# Patient Record
Sex: FEMALE | Race: WHITE | NOT HISPANIC OR LATINO | URBAN - METROPOLITAN AREA
[De-identification: names, ages, dates, MRNs, and addresses within clinical notes are randomized per-mention and may not be internally consistent; named-entity substitution may affect disease eponyms.]

---

## 2019-09-04 ENCOUNTER — INPATIENT (INPATIENT)
Facility: HOSPITAL | Age: 74
LOS: 4 days | Discharge: HOME | DRG: 460 | End: 2019-09-09
Attending: ORTHOPAEDIC SURGERY | Admitting: ORTHOPAEDIC SURGERY
Payer: MEDICARE

## 2019-09-04 VITALS
DIASTOLIC BLOOD PRESSURE: 60 MMHG | RESPIRATION RATE: 16 BRPM | TEMPERATURE: 98 F | WEIGHT: 114.2 LBS | HEIGHT: 59 IN | OXYGEN SATURATION: 98 % | HEART RATE: 82 BPM | SYSTOLIC BLOOD PRESSURE: 158 MMHG

## 2019-09-04 DIAGNOSIS — M48.00 SPINAL STENOSIS, SITE UNSPECIFIED: ICD-10-CM

## 2019-09-04 RX ORDER — ACETAMINOPHEN 500 MG
650 TABLET ORAL EVERY 6 HOURS
Refills: 0 | Status: DISCONTINUED | OUTPATIENT
Start: 2019-09-04 | End: 2019-09-06

## 2019-09-04 RX ORDER — LABETALOL HCL 100 MG
10 TABLET ORAL ONCE
Refills: 0 | Status: COMPLETED | OUTPATIENT
Start: 2019-09-04 | End: 2019-09-04

## 2019-09-04 RX ORDER — OXYCODONE AND ACETAMINOPHEN 5; 325 MG/1; MG/1
1 TABLET ORAL EVERY 4 HOURS
Refills: 0 | Status: DISCONTINUED | OUTPATIENT
Start: 2019-09-04 | End: 2019-09-06

## 2019-09-04 RX ORDER — OLMESARTAN MEDOXOMIL 5 MG/1
1 TABLET, FILM COATED ORAL
Qty: 0 | Refills: 0 | DISCHARGE

## 2019-09-04 RX ORDER — OXYCODONE AND ACETAMINOPHEN 5; 325 MG/1; MG/1
2 TABLET ORAL EVERY 4 HOURS
Refills: 0 | Status: DISCONTINUED | OUTPATIENT
Start: 2019-09-04 | End: 2019-09-06

## 2019-09-04 RX ORDER — HYDROMORPHONE HYDROCHLORIDE 2 MG/ML
0.5 INJECTION INTRAMUSCULAR; INTRAVENOUS; SUBCUTANEOUS
Refills: 0 | Status: DISCONTINUED | OUTPATIENT
Start: 2019-09-04 | End: 2019-09-06

## 2019-09-04 RX ORDER — LOSARTAN POTASSIUM 100 MG/1
100 TABLET, FILM COATED ORAL DAILY
Refills: 0 | Status: DISCONTINUED | OUTPATIENT
Start: 2019-09-04 | End: 2019-09-06

## 2019-09-04 RX ORDER — CEFAZOLIN SODIUM 1 G
2000 VIAL (EA) INJECTION EVERY 8 HOURS
Refills: 0 | Status: DISCONTINUED | OUTPATIENT
Start: 2019-09-04 | End: 2019-09-04

## 2019-09-04 RX ORDER — METOCLOPRAMIDE HCL 10 MG
10 TABLET ORAL ONCE
Refills: 0 | Status: COMPLETED | OUTPATIENT
Start: 2019-09-04 | End: 2019-09-04

## 2019-09-04 RX ORDER — MAGNESIUM HYDROXIDE 400 MG/1
30 TABLET, CHEWABLE ORAL EVERY 12 HOURS
Refills: 0 | Status: DISCONTINUED | OUTPATIENT
Start: 2019-09-04 | End: 2019-09-06

## 2019-09-04 RX ORDER — ONDANSETRON 8 MG/1
4 TABLET, FILM COATED ORAL ONCE
Refills: 0 | Status: DISCONTINUED | OUTPATIENT
Start: 2019-09-04 | End: 2019-09-06

## 2019-09-04 RX ORDER — DOCUSATE SODIUM 100 MG
100 CAPSULE ORAL THREE TIMES A DAY
Refills: 0 | Status: DISCONTINUED | OUTPATIENT
Start: 2019-09-04 | End: 2019-09-06

## 2019-09-04 RX ORDER — SODIUM CHLORIDE 9 MG/ML
1000 INJECTION, SOLUTION INTRAVENOUS
Refills: 0 | Status: DISCONTINUED | OUTPATIENT
Start: 2019-09-04 | End: 2019-09-05

## 2019-09-04 RX ORDER — HYDROMORPHONE HYDROCHLORIDE 2 MG/ML
0.5 INJECTION INTRAMUSCULAR; INTRAVENOUS; SUBCUTANEOUS EVERY 4 HOURS
Refills: 0 | Status: DISCONTINUED | OUTPATIENT
Start: 2019-09-04 | End: 2019-09-06

## 2019-09-04 RX ORDER — SENNA PLUS 8.6 MG/1
2 TABLET ORAL AT BEDTIME
Refills: 0 | Status: DISCONTINUED | OUTPATIENT
Start: 2019-09-04 | End: 2019-09-06

## 2019-09-04 RX ORDER — CEFAZOLIN SODIUM 1 G
2000 VIAL (EA) INJECTION EVERY 8 HOURS
Refills: 0 | Status: COMPLETED | OUTPATIENT
Start: 2019-09-04 | End: 2019-09-05

## 2019-09-04 RX ORDER — BUPIVACAINE 13.3 MG/ML
20 INJECTION, SUSPENSION, LIPOSOMAL INFILTRATION ONCE
Refills: 0 | Status: DISCONTINUED | OUTPATIENT
Start: 2019-09-04 | End: 2019-09-06

## 2019-09-04 RX ADMIN — Medication 10 MILLIGRAM(S): at 19:55

## 2019-09-04 RX ADMIN — Medication 650 MILLIGRAM(S): at 21:16

## 2019-09-04 RX ADMIN — HYDROMORPHONE HYDROCHLORIDE 0.5 MILLIGRAM(S): 2 INJECTION INTRAMUSCULAR; INTRAVENOUS; SUBCUTANEOUS at 18:35

## 2019-09-04 RX ADMIN — Medication 2000 MILLIGRAM(S): at 21:15

## 2019-09-04 RX ADMIN — SODIUM CHLORIDE 80 MILLILITER(S): 9 INJECTION, SOLUTION INTRAVENOUS at 18:21

## 2019-09-04 RX ADMIN — Medication 10 MILLIGRAM(S): at 19:08

## 2019-09-04 RX ADMIN — HYDROMORPHONE HYDROCHLORIDE 0.5 MILLIGRAM(S): 2 INJECTION INTRAMUSCULAR; INTRAVENOUS; SUBCUTANEOUS at 18:17

## 2019-09-04 RX ADMIN — Medication 650 MILLIGRAM(S): at 22:16

## 2019-09-04 NOTE — H&P ADULT - HISTORY OF PRESENT ILLNESS
73y M with back pain and leg pain x numerous years that is worsening. Patient states pain is worse with walking. Patient has intermittent numbness/tingling. Denies any weakness. Patient does not use any ambulation device. Patient has tried oral pain meds without much relief. Patient here for staged OLIF L3-5, PSF L3-5.

## 2019-09-04 NOTE — H&P ADULT - PROBLEM SELECTOR PLAN 1
Admit to Orthopaedic Service.  Presents today for elective OLIF L3-5, PSF L3-5  Pt medically stable and cleared for procedure today by Dr. Pulliam

## 2019-09-04 NOTE — PROGRESS NOTE ADULT - ASSESSMENT
A/P: 73yFemale s/p L3-L5 OLIF  - Stable  - Pain Control  - DVT ppx: SCDs  - Post op abx: Ancef 2g Q8H x 2 doses  - WBS: WBAT  - PT: pending PT eval    Ortho Pager [phone]

## 2019-09-04 NOTE — H&P ADULT - NSHPPHYSICALEXAM_GEN_ALL_CORE
PE: Normal head, atraumatic. Normal skin, no rashes/lesions/erythema.  Back: No TTP  LE:  Muscle strength 5/5 to b/l  LE  Sensation intact to light touch  Compartments are soft and compressible b/l, nonTTP  Pedal pulse 2+            Rest of PE per medical clearance.

## 2019-09-04 NOTE — H&P ADULT - NSHPLABSRESULTS_GEN_ALL_CORE
Preop cbc, bmp slight decrease in Na to 131, pt/inr, ptt, wnl per clearance nasal swab 3M DOS  UA with positive trace leuk  Repeat BMP and UA  preop cxr wnl per clearance  preop ekg - Sinus rhythm possible left atrial enlargement LBBB - wnl per clearance

## 2019-09-05 LAB
ANION GAP SERPL CALC-SCNC: 14 MMOL/L — SIGNIFICANT CHANGE UP (ref 5–17)
ANION GAP SERPL CALC-SCNC: 17 MMOL/L — SIGNIFICANT CHANGE UP (ref 5–17)
BASOPHILS # BLD AUTO: 0.01 K/UL — SIGNIFICANT CHANGE UP (ref 0–0.2)
BASOPHILS NFR BLD AUTO: 0.1 % — SIGNIFICANT CHANGE UP (ref 0–2)
BUN SERPL-MCNC: 17 MG/DL — SIGNIFICANT CHANGE UP (ref 7–23)
BUN SERPL-MCNC: 19 MG/DL — SIGNIFICANT CHANGE UP (ref 7–23)
CALCIUM SERPL-MCNC: 8.6 MG/DL — SIGNIFICANT CHANGE UP (ref 8.4–10.5)
CALCIUM SERPL-MCNC: 8.9 MG/DL — SIGNIFICANT CHANGE UP (ref 8.4–10.5)
CHLORIDE SERPL-SCNC: 96 MMOL/L — SIGNIFICANT CHANGE UP (ref 96–108)
CHLORIDE SERPL-SCNC: 98 MMOL/L — SIGNIFICANT CHANGE UP (ref 96–108)
CO2 SERPL-SCNC: 15 MMOL/L — LOW (ref 22–31)
CO2 SERPL-SCNC: 19 MMOL/L — LOW (ref 22–31)
CREAT SERPL-MCNC: 0.69 MG/DL — SIGNIFICANT CHANGE UP (ref 0.5–1.3)
CREAT SERPL-MCNC: 0.73 MG/DL — SIGNIFICANT CHANGE UP (ref 0.5–1.3)
EOSINOPHIL # BLD AUTO: 0 K/UL — SIGNIFICANT CHANGE UP (ref 0–0.5)
EOSINOPHIL NFR BLD AUTO: 0 % — SIGNIFICANT CHANGE UP (ref 0–6)
GLUCOSE SERPL-MCNC: 175 MG/DL — HIGH (ref 70–99)
GLUCOSE SERPL-MCNC: 225 MG/DL — HIGH (ref 70–99)
HCT VFR BLD CALC: 33.6 % — LOW (ref 34.5–45)
HCT VFR BLD CALC: 35 % — SIGNIFICANT CHANGE UP (ref 34.5–45)
HCV AB S/CO SERPL IA: 0.07 S/CO — SIGNIFICANT CHANGE UP
HCV AB SERPL-IMP: SIGNIFICANT CHANGE UP
HGB BLD-MCNC: 11.1 G/DL — LOW (ref 11.5–15.5)
HGB BLD-MCNC: 11.6 G/DL — SIGNIFICANT CHANGE UP (ref 11.5–15.5)
IMM GRANULOCYTES NFR BLD AUTO: 0.7 % — SIGNIFICANT CHANGE UP (ref 0–1.5)
LYMPHOCYTES # BLD AUTO: 1.04 K/UL — SIGNIFICANT CHANGE UP (ref 1–3.3)
LYMPHOCYTES # BLD AUTO: 8 % — LOW (ref 13–44)
MCHC RBC-ENTMCNC: 30.9 PG — SIGNIFICANT CHANGE UP (ref 27–34)
MCHC RBC-ENTMCNC: 31.1 PG — SIGNIFICANT CHANGE UP (ref 27–34)
MCHC RBC-ENTMCNC: 33 GM/DL — SIGNIFICANT CHANGE UP (ref 32–36)
MCHC RBC-ENTMCNC: 33.1 GM/DL — SIGNIFICANT CHANGE UP (ref 32–36)
MCV RBC AUTO: 93.6 FL — SIGNIFICANT CHANGE UP (ref 80–100)
MCV RBC AUTO: 93.8 FL — SIGNIFICANT CHANGE UP (ref 80–100)
MONOCYTES # BLD AUTO: 1.04 K/UL — HIGH (ref 0–0.9)
MONOCYTES NFR BLD AUTO: 8 % — SIGNIFICANT CHANGE UP (ref 2–14)
NEUTROPHILS # BLD AUTO: 10.83 K/UL — HIGH (ref 1.8–7.4)
NEUTROPHILS NFR BLD AUTO: 83.2 % — HIGH (ref 43–77)
NRBC # BLD: 0 /100 WBCS — SIGNIFICANT CHANGE UP (ref 0–0)
NRBC # BLD: 0 /100 WBCS — SIGNIFICANT CHANGE UP (ref 0–0)
PLATELET # BLD AUTO: 219 K/UL — SIGNIFICANT CHANGE UP (ref 150–400)
PLATELET # BLD AUTO: 246 K/UL — SIGNIFICANT CHANGE UP (ref 150–400)
POTASSIUM SERPL-MCNC: 4.6 MMOL/L — SIGNIFICANT CHANGE UP (ref 3.5–5.3)
POTASSIUM SERPL-MCNC: 4.6 MMOL/L — SIGNIFICANT CHANGE UP (ref 3.5–5.3)
POTASSIUM SERPL-SCNC: 4.6 MMOL/L — SIGNIFICANT CHANGE UP (ref 3.5–5.3)
POTASSIUM SERPL-SCNC: 4.6 MMOL/L — SIGNIFICANT CHANGE UP (ref 3.5–5.3)
RBC # BLD: 3.59 M/UL — LOW (ref 3.8–5.2)
RBC # BLD: 3.73 M/UL — LOW (ref 3.8–5.2)
RBC # FLD: 11.7 % — SIGNIFICANT CHANGE UP (ref 10.3–14.5)
RBC # FLD: 11.9 % — SIGNIFICANT CHANGE UP (ref 10.3–14.5)
SODIUM SERPL-SCNC: 129 MMOL/L — LOW (ref 135–145)
SODIUM SERPL-SCNC: 130 MMOL/L — LOW (ref 135–145)
WBC # BLD: 13.01 K/UL — HIGH (ref 3.8–10.5)
WBC # BLD: 13.16 K/UL — HIGH (ref 3.8–10.5)
WBC # FLD AUTO: 13.01 K/UL — HIGH (ref 3.8–10.5)
WBC # FLD AUTO: 13.16 K/UL — HIGH (ref 3.8–10.5)

## 2019-09-05 PROCEDURE — 99222 1ST HOSP IP/OBS MODERATE 55: CPT

## 2019-09-05 RX ORDER — DIAZEPAM 5 MG
5 TABLET ORAL ONCE
Refills: 0 | Status: DISCONTINUED | OUTPATIENT
Start: 2019-09-05 | End: 2019-09-05

## 2019-09-05 RX ORDER — SODIUM CHLORIDE 9 MG/ML
1000 INJECTION, SOLUTION INTRAVENOUS
Refills: 0 | Status: DISCONTINUED | OUTPATIENT
Start: 2019-09-05 | End: 2019-09-06

## 2019-09-05 RX ADMIN — Medication 650 MILLIGRAM(S): at 03:03

## 2019-09-05 RX ADMIN — Medication 650 MILLIGRAM(S): at 15:00

## 2019-09-05 RX ADMIN — Medication 650 MILLIGRAM(S): at 09:00

## 2019-09-05 RX ADMIN — Medication 2000 MILLIGRAM(S): at 05:51

## 2019-09-05 RX ADMIN — OXYCODONE AND ACETAMINOPHEN 2 TABLET(S): 5; 325 TABLET ORAL at 22:32

## 2019-09-05 RX ADMIN — OXYCODONE AND ACETAMINOPHEN 2 TABLET(S): 5; 325 TABLET ORAL at 22:02

## 2019-09-05 RX ADMIN — Medication 650 MILLIGRAM(S): at 10:00

## 2019-09-05 RX ADMIN — LOSARTAN POTASSIUM 100 MILLIGRAM(S): 100 TABLET, FILM COATED ORAL at 05:51

## 2019-09-05 RX ADMIN — Medication 650 MILLIGRAM(S): at 16:00

## 2019-09-05 RX ADMIN — Medication 650 MILLIGRAM(S): at 02:03

## 2019-09-05 RX ADMIN — Medication 5 MILLIGRAM(S): at 05:51

## 2019-09-05 NOTE — PHYSICAL THERAPY INITIAL EVALUATION ADULT - PERTINENT HX OF CURRENT PROBLEM, REHAB EVAL
73y M with back pain and leg pain x numerous years that is worsening. Patient states pain is worse with walking. Patient has intermittent numbness/tingling. Denies any weakness. Patient does not use any ambulation device. Patient has tried oral pain meds without much relief. Now s/p  OLIF L3-5, PSF L3-5.

## 2019-09-05 NOTE — PHYSICAL THERAPY INITIAL EVALUATION ADULT - GAIT DEVIATIONS NOTED, PT EVAL
decreased step length/increased time in double stance/decreased velocity of limb motion/increased stride width

## 2019-09-05 NOTE — PHYSICAL THERAPY INITIAL EVALUATION ADULT - GENERAL OBSERVATIONS, REHAB EVAL
Patient received semi-lynn in bed  in NAD on RA, +SCDs, +Prado, +Heplock. Cleared by JOANN Ballesteros. Agreeable to PT.

## 2019-09-05 NOTE — DISCHARGE NOTE PROVIDER - NSDCCPCAREPLAN_GEN_ALL_CORE_FT
PRINCIPAL DISCHARGE DIAGNOSIS  Diagnosis: Spinal stenosis, unspecified spinal region  Assessment and Plan of Treatment: Spinal stenosis, unspecified spinal region

## 2019-09-05 NOTE — PHYSICAL THERAPY INITIAL EVALUATION ADULT - PLANNED THERAPY INTERVENTIONS, PT EVAL
gait training/neuromuscular re-education/postural re-education/strengthening/transfer training/balance training/bed mobility training

## 2019-09-05 NOTE — DISCHARGE NOTE PROVIDER - NSDCFUADDINST_GEN_ALL_CORE_FT
No strenuous activity (bending/twisting), heavy lifting, driving or returning to work until cleared by MD.  Change dressing daily with gauze/tape till post-op day #5, then leave incision open to air.  You may shower post-op day#5, keep incision clean and dry.   Try to have regular bowel movements, take stool softener or laxative if necessary.  May take pepcid or zantac for upset stomach.  May apply ice to affected areas to decrease swelling.  Call to schedule an appt with Dr. Nelson for follow up, if you have staples or sutures they will be removed in office.  Contact your doctor if you experience: fever greater than 101.5, chills, chest pain, difficulty breathing, redness or excessive drainage around the incision, other concerns.  Follow up with your primary care provider. No strenuous activity (bending/twisting), heavy lifting, driving or returning to work until cleared by MD.  Change dressing in Post op day 5 and may leave to air if no drainage.  Try to have regular bowel movements, take stool softener or laxative if necessary.  May take pepcid or zantac for upset stomach.  May apply ice to affected areas to decrease swelling.  Call to schedule an appt with Dr. Nelson for follow up, if you have staples or sutures they will be removed in office.  Contact your doctor if you experience: fever greater than 101.5, chills, chest pain, difficulty breathing, redness or excessive drainage around the incision, other concerns.  Follow up with your primary care provider.

## 2019-09-05 NOTE — PHYSICAL THERAPY INITIAL EVALUATION ADULT - CRITERIA FOR SKILLED THERAPEUTIC INTERVENTIONS
therapy frequency/anticipated equipment needs at discharge/anticipated discharge recommendation/risk reduction/prevention/impairments found/predicted duration of therapy intervention/functional limitations in following categories

## 2019-09-05 NOTE — DISCHARGE NOTE PROVIDER - NSDCCPTREATMENT_GEN_ALL_CORE_FT
PRINCIPAL PROCEDURE  Procedure: OLIF, spine, lumbar  Findings and Treatment: PRINCIPAL PROCEDURE  Procedure: OLIF, spine, lumbar  Findings and Treatment:       SECONDARY PROCEDURE  Procedure: TLIF, 1 level  Findings and Treatment:     Procedure: Fusion of 6 or less spinal segments by posterior approach  Findings and Treatment:

## 2019-09-05 NOTE — DISCHARGE NOTE PROVIDER - CARE PROVIDER_API CALL
Marcus Nelson)  Orthopaedic Surgery  [address] [place] [address]  Phone: [phone]  Fax: [phone]  Follow Up Time:

## 2019-09-05 NOTE — DISCHARGE NOTE PROVIDER - INSTRUCTIONS
as tolerated as tolerated  Patient has low sodium but stable. Eat food with salt at home. Follow up with primary care provider

## 2019-09-05 NOTE — CONSULT NOTE ADULT - SUBJECTIVE AND OBJECTIVE BOX
Patient is a 73y old  Female who presents with a chief complaint of back and leg pain (04 Sep 2019 21:48)        HPI:  73y M with back pain and leg pain x numerous years that is worsening. Patient states pain is worse with walking. Patient has intermittent numbness/tingling. Denies any weakness. Patient does not use any ambulation device. Patient has tried oral pain meds without much relief. Patient here for staged OLIF L3-5, PSF L3-5. (04 Sep 2019 13:33)  s/p surgery - back pain and radiculopathy - continues to have LLE radicular pain     Allergies  No Known Allergies      Health Issues  M54.17 M51.36  No pertinent family history in first degree relatives  Handoff  MEWS Score  No pertinent past medical history  Spinal stenosis, unspecified spinal region  OLIF, spine, lumbar  No significant past surgical history        FAMILY HISTORY:  No pertinent family history in first degree relatives      MEDICATIONS  (STANDING):  acetaminophen   Tablet .. 650 milliGRAM(s) Oral every 6 hours  BUpivacaine liposome 1.3% Injectable (no eMAR) 20 milliLiter(s) Local Injection once  docusate sodium 100 milliGRAM(s) Oral three times a day  losartan 100 milliGRAM(s) Oral daily  ondansetron Injectable 4 milliGRAM(s) IV Push once  senna 2 Tablet(s) Oral at bedtime    MEDICATIONS  (PRN):  HYDROmorphone  Injectable 0.5 milliGRAM(s) SubCutaneous every 4 hours PRN breakthrough pain  HYDROmorphone  Injectable 0.5 milliGRAM(s) IV Push every 15 minutes PRN Severe Pain (7 - 10)  magnesium hydroxide Suspension 30 milliLiter(s) Oral every 12 hours PRN Constipation  oxyCODONE    5 mG/acetaminophen 325 mG 1 Tablet(s) Oral every 4 hours PRN Moderate Pain (4 - 6)  oxyCODONE    5 mG/acetaminophen 325 mG 2 Tablet(s) Oral every 4 hours PRN Severe Pain (7 - 10)      PAST MEDICAL & SURGICAL HISTORY:  No pertinent past medical history  No significant past surgical history      Labs      09-04    131<L>  |  100  |  16  ----------------------------<  202<H>  4.9   |  20<L>  |  0.59    Ca    8.3<L>      04 Sep 2019 17:53    TPro  7.0  /  Alb  4.1  /  TBili  0.9  /  DBili  x   /  AST  64<H>  /  ALT  70<H>  /  AlkPhos  74  09-04      Radiology:    Physical Exam    MENTAL STATUS  -Level of Consciousness- awake    Orientation- person, place time  Language- aphasia/ dysarthria- nl  Memory- recent and remote- nl      Cranial Nerve 1- 12  Pupils- equal and reactive  Eye movements- full   Facial - no asymmetry   Lower CN-nl    Gait and Station- full     MOTOR  Upper-nl  Lower- no foot drop    Reflexes- decreased     Sensation- no sensory level    Cerebellar- no tremors    vascular -no bruits    Assessment- Lumbar radiculopathy    Plan  FU surgery on Friday

## 2019-09-05 NOTE — PHYSICAL THERAPY INITIAL EVALUATION ADULT - ADDITIONAL COMMENTS
Patient lives with spouse in apartment building with no steps to enter. Does not use any Assistive Device at baseline. Denies recent Hx of falls.

## 2019-09-06 LAB
ANION GAP SERPL CALC-SCNC: 12 MMOL/L — SIGNIFICANT CHANGE UP (ref 5–17)
ANION GAP SERPL CALC-SCNC: 13 MMOL/L — SIGNIFICANT CHANGE UP (ref 5–17)
BUN SERPL-MCNC: 13 MG/DL — SIGNIFICANT CHANGE UP (ref 7–23)
BUN SERPL-MCNC: 13 MG/DL — SIGNIFICANT CHANGE UP (ref 7–23)
CALCIUM SERPL-MCNC: 8.2 MG/DL — LOW (ref 8.4–10.5)
CALCIUM SERPL-MCNC: 9 MG/DL — SIGNIFICANT CHANGE UP (ref 8.4–10.5)
CHLORIDE SERPL-SCNC: 98 MMOL/L — SIGNIFICANT CHANGE UP (ref 96–108)
CHLORIDE SERPL-SCNC: 99 MMOL/L — SIGNIFICANT CHANGE UP (ref 96–108)
CO2 SERPL-SCNC: 19 MMOL/L — LOW (ref 22–31)
CO2 SERPL-SCNC: 19 MMOL/L — LOW (ref 22–31)
CREAT SERPL-MCNC: 0.55 MG/DL — SIGNIFICANT CHANGE UP (ref 0.5–1.3)
CREAT SERPL-MCNC: 0.57 MG/DL — SIGNIFICANT CHANGE UP (ref 0.5–1.3)
GLUCOSE SERPL-MCNC: 161 MG/DL — HIGH (ref 70–99)
GLUCOSE SERPL-MCNC: 255 MG/DL — HIGH (ref 70–99)
HCT VFR BLD CALC: 29 % — LOW (ref 34.5–45)
HGB BLD-MCNC: 10 G/DL — LOW (ref 11.5–15.5)
MCHC RBC-ENTMCNC: 32.5 PG — SIGNIFICANT CHANGE UP (ref 27–34)
MCHC RBC-ENTMCNC: 34.5 GM/DL — SIGNIFICANT CHANGE UP (ref 32–36)
MCV RBC AUTO: 94.2 FL — SIGNIFICANT CHANGE UP (ref 80–100)
NRBC # BLD: 0 /100 WBCS — SIGNIFICANT CHANGE UP (ref 0–0)
PLATELET # BLD AUTO: 252 K/UL — SIGNIFICANT CHANGE UP (ref 150–400)
POTASSIUM SERPL-MCNC: 4.6 MMOL/L — SIGNIFICANT CHANGE UP (ref 3.5–5.3)
POTASSIUM SERPL-MCNC: 5 MMOL/L — SIGNIFICANT CHANGE UP (ref 3.5–5.3)
POTASSIUM SERPL-SCNC: 4.6 MMOL/L — SIGNIFICANT CHANGE UP (ref 3.5–5.3)
POTASSIUM SERPL-SCNC: 5 MMOL/L — SIGNIFICANT CHANGE UP (ref 3.5–5.3)
RBC # BLD: 3.08 M/UL — LOW (ref 3.8–5.2)
RBC # FLD: 12.1 % — SIGNIFICANT CHANGE UP (ref 10.3–14.5)
SODIUM SERPL-SCNC: 130 MMOL/L — LOW (ref 135–145)
SODIUM SERPL-SCNC: 130 MMOL/L — LOW (ref 135–145)
WBC # BLD: 17.46 K/UL — HIGH (ref 3.8–10.5)
WBC # FLD AUTO: 17.46 K/UL — HIGH (ref 3.8–10.5)

## 2019-09-06 PROCEDURE — 99232 SBSQ HOSP IP/OBS MODERATE 35: CPT

## 2019-09-06 RX ORDER — SENNA PLUS 8.6 MG/1
2 TABLET ORAL AT BEDTIME
Refills: 0 | Status: DISCONTINUED | OUTPATIENT
Start: 2019-09-06 | End: 2019-09-09

## 2019-09-06 RX ORDER — HYDRALAZINE HCL 50 MG
10 TABLET ORAL ONCE
Refills: 0 | Status: COMPLETED | OUTPATIENT
Start: 2019-09-06 | End: 2019-09-06

## 2019-09-06 RX ORDER — OXYCODONE HYDROCHLORIDE 5 MG/1
5 TABLET ORAL EVERY 4 HOURS
Refills: 0 | Status: DISCONTINUED | OUTPATIENT
Start: 2019-09-06 | End: 2019-09-09

## 2019-09-06 RX ORDER — CEFAZOLIN SODIUM 1 G
2000 VIAL (EA) INJECTION EVERY 8 HOURS
Refills: 0 | Status: COMPLETED | OUTPATIENT
Start: 2019-09-06 | End: 2019-09-07

## 2019-09-06 RX ORDER — HYDROMORPHONE HYDROCHLORIDE 2 MG/ML
0.5 INJECTION INTRAMUSCULAR; INTRAVENOUS; SUBCUTANEOUS
Refills: 0 | Status: DISCONTINUED | OUTPATIENT
Start: 2019-09-06 | End: 2019-09-09

## 2019-09-06 RX ORDER — ONDANSETRON 8 MG/1
4 TABLET, FILM COATED ORAL EVERY 6 HOURS
Refills: 0 | Status: DISCONTINUED | OUTPATIENT
Start: 2019-09-06 | End: 2019-09-09

## 2019-09-06 RX ORDER — BUPIVACAINE 13.3 MG/ML
20 INJECTION, SUSPENSION, LIPOSOMAL INFILTRATION ONCE
Refills: 0 | Status: DISCONTINUED | OUTPATIENT
Start: 2019-09-06 | End: 2019-09-09

## 2019-09-06 RX ORDER — FAMOTIDINE 10 MG/ML
20 INJECTION INTRAVENOUS EVERY 12 HOURS
Refills: 0 | Status: DISCONTINUED | OUTPATIENT
Start: 2019-09-06 | End: 2019-09-09

## 2019-09-06 RX ORDER — METOCLOPRAMIDE HCL 10 MG
10 TABLET ORAL ONCE
Refills: 0 | Status: DISCONTINUED | OUTPATIENT
Start: 2019-09-06 | End: 2019-09-09

## 2019-09-06 RX ORDER — ACETAMINOPHEN 500 MG
650 TABLET ORAL EVERY 6 HOURS
Refills: 0 | Status: DISCONTINUED | OUTPATIENT
Start: 2019-09-06 | End: 2019-09-09

## 2019-09-06 RX ORDER — OXYCODONE HYDROCHLORIDE 5 MG/1
10 TABLET ORAL EVERY 4 HOURS
Refills: 0 | Status: DISCONTINUED | OUTPATIENT
Start: 2019-09-06 | End: 2019-09-09

## 2019-09-06 RX ORDER — DOCUSATE SODIUM 100 MG
100 CAPSULE ORAL THREE TIMES A DAY
Refills: 0 | Status: DISCONTINUED | OUTPATIENT
Start: 2019-09-06 | End: 2019-09-09

## 2019-09-06 RX ORDER — SODIUM CHLORIDE 9 MG/ML
1000 INJECTION, SOLUTION INTRAVENOUS
Refills: 0 | Status: DISCONTINUED | OUTPATIENT
Start: 2019-09-06 | End: 2019-09-06

## 2019-09-06 RX ORDER — CEFAZOLIN SODIUM 1 G
2000 VIAL (EA) INJECTION EVERY 8 HOURS
Refills: 0 | Status: DISCONTINUED | OUTPATIENT
Start: 2019-09-06 | End: 2019-09-06

## 2019-09-06 RX ORDER — DIPHENHYDRAMINE HCL 50 MG
12.5 CAPSULE ORAL EVERY 4 HOURS
Refills: 0 | Status: DISCONTINUED | OUTPATIENT
Start: 2019-09-06 | End: 2019-09-09

## 2019-09-06 RX ADMIN — Medication 10 MILLIGRAM(S): at 15:44

## 2019-09-06 RX ADMIN — OXYCODONE HYDROCHLORIDE 5 MILLIGRAM(S): 5 TABLET ORAL at 21:07

## 2019-09-06 RX ADMIN — SODIUM CHLORIDE 100 MILLILITER(S): 9 INJECTION, SOLUTION INTRAVENOUS at 15:12

## 2019-09-06 RX ADMIN — LOSARTAN POTASSIUM 100 MILLIGRAM(S): 100 TABLET, FILM COATED ORAL at 05:14

## 2019-09-06 RX ADMIN — OXYCODONE HYDROCHLORIDE 5 MILLIGRAM(S): 5 TABLET ORAL at 21:40

## 2019-09-06 RX ADMIN — HYDROMORPHONE HYDROCHLORIDE 0.5 MILLIGRAM(S): 2 INJECTION INTRAMUSCULAR; INTRAVENOUS; SUBCUTANEOUS at 16:11

## 2019-09-06 RX ADMIN — HYDROMORPHONE HYDROCHLORIDE 0.5 MILLIGRAM(S): 2 INJECTION INTRAMUSCULAR; INTRAVENOUS; SUBCUTANEOUS at 16:20

## 2019-09-06 RX ADMIN — Medication 2000 MILLIGRAM(S): at 21:06
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
## 2019-09-06 NOTE — BRIEF OPERATIVE NOTE - NSICDXBRIEFPROCEDURE_GEN_ALL_CORE_FT
PROCEDURES:  OLIF, spine, lumbar 04-Sep-2019 17:23:16  Neal Nova
PROCEDURES:  Fusion of 6 or less spinal segments by posterior approach 06-Sep-2019 14:21:13  Karri Umanzor  TLIF, 1 level 06-Sep-2019 14:20:53  Karri Umanzor

## 2019-09-07 LAB
ANION GAP SERPL CALC-SCNC: 9 MMOL/L — SIGNIFICANT CHANGE UP (ref 5–17)
BASOPHILS # BLD AUTO: 0.01 K/UL — SIGNIFICANT CHANGE UP (ref 0–0.2)
BASOPHILS NFR BLD AUTO: 0.1 % — SIGNIFICANT CHANGE UP (ref 0–2)
BUN SERPL-MCNC: 15 MG/DL — SIGNIFICANT CHANGE UP (ref 7–23)
CALCIUM SERPL-MCNC: 8.3 MG/DL — LOW (ref 8.4–10.5)
CHLORIDE SERPL-SCNC: 100 MMOL/L — SIGNIFICANT CHANGE UP (ref 96–108)
CO2 SERPL-SCNC: 23 MMOL/L — SIGNIFICANT CHANGE UP (ref 22–31)
CREAT SERPL-MCNC: 0.58 MG/DL — SIGNIFICANT CHANGE UP (ref 0.5–1.3)
EOSINOPHIL # BLD AUTO: 0.01 K/UL — SIGNIFICANT CHANGE UP (ref 0–0.5)
EOSINOPHIL NFR BLD AUTO: 0.1 % — SIGNIFICANT CHANGE UP (ref 0–6)
GLUCOSE SERPL-MCNC: 138 MG/DL — HIGH (ref 70–99)
HCT VFR BLD CALC: 26.8 % — LOW (ref 34.5–45)
HGB BLD-MCNC: 8.8 G/DL — LOW (ref 11.5–15.5)
IMM GRANULOCYTES NFR BLD AUTO: 0.6 % — SIGNIFICANT CHANGE UP (ref 0–1.5)
LYMPHOCYTES # BLD AUTO: 16.9 % — SIGNIFICANT CHANGE UP (ref 13–44)
LYMPHOCYTES # BLD AUTO: 2.61 K/UL — SIGNIFICANT CHANGE UP (ref 1–3.3)
MCHC RBC-ENTMCNC: 31.5 PG — SIGNIFICANT CHANGE UP (ref 27–34)
MCHC RBC-ENTMCNC: 32.8 GM/DL — SIGNIFICANT CHANGE UP (ref 32–36)
MCV RBC AUTO: 96.1 FL — SIGNIFICANT CHANGE UP (ref 80–100)
MONOCYTES # BLD AUTO: 1.9 K/UL — HIGH (ref 0–0.9)
MONOCYTES NFR BLD AUTO: 12.3 % — SIGNIFICANT CHANGE UP (ref 2–14)
NEUTROPHILS # BLD AUTO: 10.78 K/UL — HIGH (ref 1.8–7.4)
NEUTROPHILS NFR BLD AUTO: 70 % — SIGNIFICANT CHANGE UP (ref 43–77)
NRBC # BLD: 0 /100 WBCS — SIGNIFICANT CHANGE UP (ref 0–0)
PLATELET # BLD AUTO: 223 K/UL — SIGNIFICANT CHANGE UP (ref 150–400)
POTASSIUM SERPL-MCNC: 4.4 MMOL/L — SIGNIFICANT CHANGE UP (ref 3.5–5.3)
POTASSIUM SERPL-SCNC: 4.4 MMOL/L — SIGNIFICANT CHANGE UP (ref 3.5–5.3)
RBC # BLD: 2.79 M/UL — LOW (ref 3.8–5.2)
RBC # FLD: 11.9 % — SIGNIFICANT CHANGE UP (ref 10.3–14.5)
SODIUM SERPL-SCNC: 132 MMOL/L — LOW (ref 135–145)
WBC # BLD: 15.41 K/UL — HIGH (ref 3.8–10.5)
WBC # FLD AUTO: 15.41 K/UL — HIGH (ref 3.8–10.5)

## 2019-09-07 PROCEDURE — 99232 SBSQ HOSP IP/OBS MODERATE 35: CPT

## 2019-09-07 RX ADMIN — Medication 100 MILLIGRAM(S): at 13:49

## 2019-09-07 RX ADMIN — Medication 650 MILLIGRAM(S): at 10:48

## 2019-09-07 RX ADMIN — OXYCODONE HYDROCHLORIDE 5 MILLIGRAM(S): 5 TABLET ORAL at 22:00

## 2019-09-07 RX ADMIN — OXYCODONE HYDROCHLORIDE 5 MILLIGRAM(S): 5 TABLET ORAL at 21:17

## 2019-09-07 RX ADMIN — OXYCODONE HYDROCHLORIDE 5 MILLIGRAM(S): 5 TABLET ORAL at 17:30

## 2019-09-07 RX ADMIN — OXYCODONE HYDROCHLORIDE 5 MILLIGRAM(S): 5 TABLET ORAL at 17:01

## 2019-09-07 RX ADMIN — Medication 2000 MILLIGRAM(S): at 05:17

## 2019-09-07 RX ADMIN — Medication 650 MILLIGRAM(S): at 17:00

## 2019-09-07 RX ADMIN — Medication 650 MILLIGRAM(S): at 10:10

## 2019-09-07 RX ADMIN — OXYCODONE HYDROCHLORIDE 5 MILLIGRAM(S): 5 TABLET ORAL at 10:48

## 2019-09-07 RX ADMIN — Medication 100 MILLIGRAM(S): at 21:12

## 2019-09-07 RX ADMIN — Medication 100 MILLIGRAM(S): at 05:18

## 2019-09-07 RX ADMIN — OXYCODONE HYDROCHLORIDE 5 MILLIGRAM(S): 5 TABLET ORAL at 06:15

## 2019-09-07 RX ADMIN — OXYCODONE HYDROCHLORIDE 5 MILLIGRAM(S): 5 TABLET ORAL at 05:35

## 2019-09-07 RX ADMIN — OXYCODONE HYDROCHLORIDE 5 MILLIGRAM(S): 5 TABLET ORAL at 10:10

## 2019-09-07 RX ADMIN — Medication 650 MILLIGRAM(S): at 17:30

## 2019-09-08 LAB
ANION GAP SERPL CALC-SCNC: 11 MMOL/L — SIGNIFICANT CHANGE UP (ref 5–17)
BASOPHILS # BLD AUTO: 0.05 K/UL — SIGNIFICANT CHANGE UP (ref 0–0.2)
BASOPHILS NFR BLD AUTO: 0.4 % — SIGNIFICANT CHANGE UP (ref 0–2)
BUN SERPL-MCNC: 15 MG/DL — SIGNIFICANT CHANGE UP (ref 7–23)
CALCIUM SERPL-MCNC: 8.2 MG/DL — LOW (ref 8.4–10.5)
CHLORIDE SERPL-SCNC: 97 MMOL/L — SIGNIFICANT CHANGE UP (ref 96–108)
CO2 SERPL-SCNC: 21 MMOL/L — LOW (ref 22–31)
CREAT SERPL-MCNC: 0.53 MG/DL — SIGNIFICANT CHANGE UP (ref 0.5–1.3)
EOSINOPHIL # BLD AUTO: 0.15 K/UL — SIGNIFICANT CHANGE UP (ref 0–0.5)
EOSINOPHIL NFR BLD AUTO: 1.1 % — SIGNIFICANT CHANGE UP (ref 0–6)
GLUCOSE SERPL-MCNC: 150 MG/DL — HIGH (ref 70–99)
HCT VFR BLD CALC: 23.9 % — LOW (ref 34.5–45)
HGB BLD-MCNC: 8.2 G/DL — LOW (ref 11.5–15.5)
IMM GRANULOCYTES NFR BLD AUTO: 1.1 % — SIGNIFICANT CHANGE UP (ref 0–1.5)
LYMPHOCYTES # BLD AUTO: 23.5 % — SIGNIFICANT CHANGE UP (ref 13–44)
LYMPHOCYTES # BLD AUTO: 3.34 K/UL — HIGH (ref 1–3.3)
MCHC RBC-ENTMCNC: 31.7 PG — SIGNIFICANT CHANGE UP (ref 27–34)
MCHC RBC-ENTMCNC: 34.3 GM/DL — SIGNIFICANT CHANGE UP (ref 32–36)
MCV RBC AUTO: 92.3 FL — SIGNIFICANT CHANGE UP (ref 80–100)
MONOCYTES # BLD AUTO: 1.38 K/UL — HIGH (ref 0–0.9)
MONOCYTES NFR BLD AUTO: 9.7 % — SIGNIFICANT CHANGE UP (ref 2–14)
NEUTROPHILS # BLD AUTO: 9.14 K/UL — HIGH (ref 1.8–7.4)
NEUTROPHILS NFR BLD AUTO: 64.2 % — SIGNIFICANT CHANGE UP (ref 43–77)
NRBC # BLD: 0 /100 WBCS — SIGNIFICANT CHANGE UP (ref 0–0)
PLATELET # BLD AUTO: 227 K/UL — SIGNIFICANT CHANGE UP (ref 150–400)
POTASSIUM SERPL-MCNC: 4.1 MMOL/L — SIGNIFICANT CHANGE UP (ref 3.5–5.3)
POTASSIUM SERPL-SCNC: 4.1 MMOL/L — SIGNIFICANT CHANGE UP (ref 3.5–5.3)
RBC # BLD: 2.59 M/UL — LOW (ref 3.8–5.2)
RBC # FLD: 12.2 % — SIGNIFICANT CHANGE UP (ref 10.3–14.5)
SODIUM SERPL-SCNC: 129 MMOL/L — LOW (ref 135–145)
WBC # BLD: 14.22 K/UL — HIGH (ref 3.8–10.5)
WBC # FLD AUTO: 14.22 K/UL — HIGH (ref 3.8–10.5)

## 2019-09-08 RX ORDER — LOSARTAN POTASSIUM 100 MG/1
100 TABLET, FILM COATED ORAL DAILY
Refills: 0 | Status: DISCONTINUED | OUTPATIENT
Start: 2019-09-08 | End: 2019-09-09

## 2019-09-08 RX ORDER — SODIUM CHLORIDE 9 MG/ML
1 INJECTION INTRAMUSCULAR; INTRAVENOUS; SUBCUTANEOUS THREE TIMES A DAY
Refills: 0 | Status: DISCONTINUED | OUTPATIENT
Start: 2019-09-08 | End: 2019-09-09

## 2019-09-08 RX ORDER — MAGNESIUM HYDROXIDE 400 MG/1
30 TABLET, CHEWABLE ORAL DAILY
Refills: 0 | Status: DISCONTINUED | OUTPATIENT
Start: 2019-09-08 | End: 2019-09-09

## 2019-09-08 RX ADMIN — OXYCODONE HYDROCHLORIDE 5 MILLIGRAM(S): 5 TABLET ORAL at 15:08

## 2019-09-08 RX ADMIN — OXYCODONE HYDROCHLORIDE 5 MILLIGRAM(S): 5 TABLET ORAL at 10:34

## 2019-09-08 RX ADMIN — SODIUM CHLORIDE 1 GRAM(S): 9 INJECTION INTRAMUSCULAR; INTRAVENOUS; SUBCUTANEOUS at 21:24

## 2019-09-08 RX ADMIN — OXYCODONE HYDROCHLORIDE 5 MILLIGRAM(S): 5 TABLET ORAL at 23:17

## 2019-09-08 RX ADMIN — OXYCODONE HYDROCHLORIDE 5 MILLIGRAM(S): 5 TABLET ORAL at 20:50

## 2019-09-08 RX ADMIN — SENNA PLUS 2 TABLET(S): 8.6 TABLET ORAL at 21:24

## 2019-09-08 RX ADMIN — SODIUM CHLORIDE 1 GRAM(S): 9 INJECTION INTRAMUSCULAR; INTRAVENOUS; SUBCUTANEOUS at 13:43

## 2019-09-08 RX ADMIN — OXYCODONE HYDROCHLORIDE 5 MILLIGRAM(S): 5 TABLET ORAL at 23:55

## 2019-09-08 RX ADMIN — OXYCODONE HYDROCHLORIDE 5 MILLIGRAM(S): 5 TABLET ORAL at 06:18

## 2019-09-08 RX ADMIN — OXYCODONE HYDROCHLORIDE 5 MILLIGRAM(S): 5 TABLET ORAL at 14:34

## 2019-09-08 RX ADMIN — OXYCODONE HYDROCHLORIDE 5 MILLIGRAM(S): 5 TABLET ORAL at 09:55

## 2019-09-08 RX ADMIN — LOSARTAN POTASSIUM 100 MILLIGRAM(S): 100 TABLET, FILM COATED ORAL at 05:55

## 2019-09-08 RX ADMIN — OXYCODONE HYDROCHLORIDE 5 MILLIGRAM(S): 5 TABLET ORAL at 05:39

## 2019-09-08 RX ADMIN — OXYCODONE HYDROCHLORIDE 5 MILLIGRAM(S): 5 TABLET ORAL at 19:09

## 2019-09-08 RX ADMIN — Medication 100 MILLIGRAM(S): at 21:24

## 2019-09-09 VITALS
OXYGEN SATURATION: 100 % | HEART RATE: 84 BPM | SYSTOLIC BLOOD PRESSURE: 144 MMHG | DIASTOLIC BLOOD PRESSURE: 50 MMHG | TEMPERATURE: 98 F | RESPIRATION RATE: 16 BRPM

## 2019-09-09 DIAGNOSIS — Z98.890 OTHER SPECIFIED POSTPROCEDURAL STATES: ICD-10-CM

## 2019-09-09 DIAGNOSIS — E87.1 HYPO-OSMOLALITY AND HYPONATREMIA: ICD-10-CM

## 2019-09-09 LAB
ANION GAP SERPL CALC-SCNC: 9 MMOL/L — SIGNIFICANT CHANGE UP (ref 5–17)
BASOPHILS # BLD AUTO: 0 K/UL — SIGNIFICANT CHANGE UP (ref 0–0.2)
BASOPHILS NFR BLD AUTO: 0 % — SIGNIFICANT CHANGE UP (ref 0–2)
BUN SERPL-MCNC: 10 MG/DL — SIGNIFICANT CHANGE UP (ref 7–23)
CALCIUM SERPL-MCNC: 8.7 MG/DL — SIGNIFICANT CHANGE UP (ref 8.4–10.5)
CHLORIDE SERPL-SCNC: 97 MMOL/L — SIGNIFICANT CHANGE UP (ref 96–108)
CO2 SERPL-SCNC: 24 MMOL/L — SIGNIFICANT CHANGE UP (ref 22–31)
CREAT SERPL-MCNC: 0.59 MG/DL — SIGNIFICANT CHANGE UP (ref 0.5–1.3)
EOSINOPHIL # BLD AUTO: 0.19 K/UL — SIGNIFICANT CHANGE UP (ref 0–0.5)
EOSINOPHIL NFR BLD AUTO: 1.7 % — SIGNIFICANT CHANGE UP (ref 0–6)
GLUCOSE SERPL-MCNC: 164 MG/DL — HIGH (ref 70–99)
HCT VFR BLD CALC: 23.9 % — LOW (ref 34.5–45)
HGB BLD-MCNC: 7.9 G/DL — LOW (ref 11.5–15.5)
LYMPHOCYTES # BLD AUTO: 2.72 K/UL — SIGNIFICANT CHANGE UP (ref 1–3.3)
LYMPHOCYTES # BLD AUTO: 24.3 % — SIGNIFICANT CHANGE UP (ref 13–44)
MCHC RBC-ENTMCNC: 31 PG — SIGNIFICANT CHANGE UP (ref 27–34)
MCHC RBC-ENTMCNC: 33.1 GM/DL — SIGNIFICANT CHANGE UP (ref 32–36)
MCV RBC AUTO: 93.7 FL — SIGNIFICANT CHANGE UP (ref 80–100)
MONOCYTES # BLD AUTO: 0.39 K/UL — SIGNIFICANT CHANGE UP (ref 0–0.9)
MONOCYTES NFR BLD AUTO: 3.5 % — SIGNIFICANT CHANGE UP (ref 2–14)
NEUTROPHILS # BLD AUTO: 7.69 K/UL — HIGH (ref 1.8–7.4)
NEUTROPHILS NFR BLD AUTO: 68.7 % — SIGNIFICANT CHANGE UP (ref 43–77)
NRBC # BLD: SIGNIFICANT CHANGE UP /100 WBCS (ref 0–0)
PLATELET # BLD AUTO: 255 K/UL — SIGNIFICANT CHANGE UP (ref 150–400)
POTASSIUM SERPL-MCNC: 3.8 MMOL/L — SIGNIFICANT CHANGE UP (ref 3.5–5.3)
POTASSIUM SERPL-SCNC: 3.8 MMOL/L — SIGNIFICANT CHANGE UP (ref 3.5–5.3)
RBC # BLD: 2.55 M/UL — LOW (ref 3.8–5.2)
RBC # FLD: 12 % — SIGNIFICANT CHANGE UP (ref 10.3–14.5)
SODIUM SERPL-SCNC: 130 MMOL/L — LOW (ref 135–145)
WBC # BLD: 11.19 K/UL — HIGH (ref 3.8–10.5)
WBC # FLD AUTO: 11.19 K/UL — HIGH (ref 3.8–10.5)

## 2019-09-09 PROCEDURE — 99222 1ST HOSP IP/OBS MODERATE 55: CPT | Mod: GC

## 2019-09-09 RX ORDER — DOCUSATE SODIUM 100 MG
1 CAPSULE ORAL
Qty: 0 | Refills: 0 | DISCHARGE
Start: 2019-09-09

## 2019-09-09 RX ORDER — ACETAMINOPHEN 500 MG
2 TABLET ORAL
Qty: 0 | Refills: 0 | DISCHARGE
Start: 2019-09-09

## 2019-09-09 RX ORDER — SENNA PLUS 8.6 MG/1
2 TABLET ORAL
Qty: 0 | Refills: 0 | DISCHARGE
Start: 2019-09-09

## 2019-09-09 RX ORDER — OXYCODONE HYDROCHLORIDE 5 MG/1
1 TABLET ORAL
Qty: 42 | Refills: 0
Start: 2019-09-09 | End: 2019-09-15

## 2019-09-09 RX ADMIN — OXYCODONE HYDROCHLORIDE 5 MILLIGRAM(S): 5 TABLET ORAL at 10:53

## 2019-09-09 RX ADMIN — OXYCODONE HYDROCHLORIDE 5 MILLIGRAM(S): 5 TABLET ORAL at 11:53

## 2019-09-09 RX ADMIN — LOSARTAN POTASSIUM 100 MILLIGRAM(S): 100 TABLET, FILM COATED ORAL at 05:32

## 2019-09-09 RX ADMIN — SODIUM CHLORIDE 1 GRAM(S): 9 INJECTION INTRAMUSCULAR; INTRAVENOUS; SUBCUTANEOUS at 05:32

## 2019-09-09 RX ADMIN — Medication 100 MILLIGRAM(S): at 05:32

## 2019-09-09 RX ADMIN — OXYCODONE HYDROCHLORIDE 5 MILLIGRAM(S): 5 TABLET ORAL at 03:36

## 2019-09-09 RX ADMIN — OXYCODONE HYDROCHLORIDE 5 MILLIGRAM(S): 5 TABLET ORAL at 04:36

## 2019-09-09 NOTE — CONSULT NOTE ADULT - PROBLEM SELECTOR RECOMMENDATION 9
the initial Na was 135 and decreased postop.  She is tolerating diet.  Na increased.  She is to incraese her salt intake and fluid restriction to 1.5 l per day . She was instructed to follow with her PMD this week

## 2019-09-09 NOTE — CONSULT NOTE ADULT - SUBJECTIVE AND OBJECTIVE BOX
Patient is a 73y old  Female who presents with a chief complaint of back and leg pain (09 Sep 2019 10:52)      HPI:  73y M with back pain and leg pain x numerous years that is worsening. Patient states pain is worse with walking. Patient has intermittent numbness/tingling. Denies any weakness. Patient does not use any ambulation device. Patient has tried oral pain meds without much relief. Patient here for staged OLIF L3-5, PSF L3-5. (04 Sep 2019 13:33)    she is doing well.  The pain is controlled.  She is moving around.  PAST MEDICAL & SURGICAL HISTORY:  No pertinent past medical history  No significant past surgical history      FAMILY HISTORY:  No pertinent family history in first degree relatives      SOCIAL HISTORY:  Smoking Status: [ ] Current, [ ] Former, [x ] Never  Pack Years:    MEDICATIONS:  Pulmonary:  diphenhydrAMINE   Injectable 12.5 milliGRAM(s) IV Push every 4 hours PRN    Antimicrobials:    Anticoagulants:    Onc:    GI/:  aluminum hydroxide/magnesium hydroxide/simethicone Suspension 30 milliLiter(s) Oral every 12 hours PRN  docusate sodium 100 milliGRAM(s) Oral three times a day  famotidine    Tablet 20 milliGRAM(s) Oral every 12 hours PRN  magnesium hydroxide Suspension 30 milliLiter(s) Oral daily PRN  senna 2 Tablet(s) Oral at bedtime    Endocrine:    Cardiac:  losartan 100 milliGRAM(s) Oral daily    Other Medications:  acetaminophen   Tablet .. 650 milliGRAM(s) Oral every 6 hours PRN  BUpivacaine liposome 1.3% Injectable (no eMAR) 20 milliLiter(s) Local Injection once  HYDROmorphone  Injectable 0.5 milliGRAM(s) SubCutaneous every 3 hours PRN  HYDROmorphone  Injectable 0.5 milliGRAM(s) IV Push every 15 minutes PRN  metoclopramide Injectable 10 milliGRAM(s) IV Push once PRN  ondansetron Injectable 4 milliGRAM(s) IV Push every 6 hours PRN  oxyCODONE    IR 5 milliGRAM(s) Oral every 4 hours PRN  oxyCODONE    IR 10 milliGRAM(s) Oral every 4 hours PRN  sodium chloride 1 Gram(s) Oral three times a day      Allergies    No Known Allergies    Intolerances        Vital Signs Last 24 Hrs  T(C): 36.9 (09 Sep 2019 09:26), Max: 36.9 (09 Sep 2019 09:26)  T(F): 98.4 (09 Sep 2019 09:26), Max: 98.4 (09 Sep 2019 09:26)  HR: 84 (09 Sep 2019 09:26) (84 - 84)  BP: 144/50 (09 Sep 2019 09:26) (137/54 - 144/50)  BP(mean): --  RR: 16 (09 Sep 2019 09:26) (15 - 16)  SpO2: 100% (09 Sep 2019 09:26) (96% - 100%)    09-08 @ 07:01  -  09-09 @ 07:00  --------------------------------------------------------  IN: 200 mL / OUT: 615 mL / NET: -415 mL    09-09 @ 07:01  -  09-09 @ 21:07  --------------------------------------------------------  IN: 180 mL / OUT: 400 mL / NET: -220 mL          LABS:      CBC Full  -  ( 09 Sep 2019 08:05 )  WBC Count : 11.19 K/uL  RBC Count : 2.55 M/uL  Hemoglobin : 7.9 g/dL  Hematocrit : 23.9 %  Platelet Count - Automated : 255 K/uL  Mean Cell Volume : 93.7 fl  Mean Cell Hemoglobin : 31.0 pg  Mean Cell Hemoglobin Concentration : 33.1 gm/dL  Auto Neutrophil # : 7.69 K/uL  Auto Lymphocyte # : 2.72 K/uL  Auto Monocyte # : 0.39 K/uL  Auto Eosinophil # : 0.19 K/uL  Auto Basophil # : 0.00 K/uL  Auto Neutrophil % : 68.7 %  Auto Lymphocyte % : 24.3 %  Auto Monocyte % : 3.5 %  Auto Eosinophil % : 1.7 %  Auto Basophil % : 0.0 %    09-09    130<L>  |  97  |  10  ----------------------------<  164<H>  3.8   |  24  |  0.59    Ca    8.7      09 Sep 2019 08:05                          RADIOLOGY & ADDITIONAL STUDIES (The following images were personally reviewed):

## 2019-09-09 NOTE — PROGRESS NOTE ADULT - REASON FOR ADMISSION
back and leg pain

## 2019-09-09 NOTE — DISCHARGE NOTE NURSING/CASE MANAGEMENT/SOCIAL WORK - PATIENT PORTAL LINK FT
You can access the FollowMyHealth Patient Portal offered by [facility] by registering at the following website: [email or web address]. By joining [employer]’s FollowMyHealth portal, you will also be able to view your health information using other applications (apps) compatible with our system.

## 2019-09-09 NOTE — PROGRESS NOTE ADULT - PROVIDER SPECIALTY LIST ADULT
Neurology
Orthopedics
Neurology
Orthopedics
Orthopedics

## 2019-09-09 NOTE — PROGRESS NOTE ADULT - SUBJECTIVE AND OBJECTIVE BOX
Neurology Follow up note    Name  FELIPE RODGERS    HPI:  73y M with back pain and leg pain x numerous years that is worsening. Patient states pain is worse with walking. Patient has intermittent numbness/tingling. Denies any weakness. Patient does not use any ambulation device. Patient has tried oral pain meds without much relief. Patient here for staged OLIF L3-5, PSF L3-5. (04 Sep 2019 13:33)      Interval History - s/p surgery number 2 - no new weakness numbness or tingling in the legs        REVIEW OF SYSTEMS    Vital Signs Last 24 Hrs  T(C): 37.2 (07 Sep 2019 08:33), Max: 37.7 (07 Sep 2019 00:34)  T(F): 98.9 (07 Sep 2019 08:33), Max: 99.8 (07 Sep 2019 00:34)  HR: 96 (07 Sep 2019 08:33) (85 - 104)  BP: 142/70 (07 Sep 2019 08:33) (126/53 - 177/73)  BP(mean): 72 (06 Sep 2019 17:00) (72 - 114)  RR: 16 (07 Sep 2019 08:33) (7 - 23)  SpO2: 99% (07 Sep 2019 08:33) (95% - 99%)    Physical Exam-     Mental Status- awake and alert    Cranial Nerves- full EOM    Gait and station- n/a    Motor- moves all 4 extremities    Reflexes- decreased    Sensation- no sensory level    Coordination- no tremors    Vascular - no bruits    Medications  acetaminophen   Tablet .. 650 milliGRAM(s) Oral every 6 hours PRN  aluminum hydroxide/magnesium hydroxide/simethicone Suspension 30 milliLiter(s) Oral every 12 hours PRN  BUpivacaine liposome 1.3% Injectable (no eMAR) 20 milliLiter(s) Local Injection once  diphenhydrAMINE   Injectable 12.5 milliGRAM(s) IV Push every 4 hours PRN  docusate sodium 100 milliGRAM(s) Oral three times a day  famotidine    Tablet 20 milliGRAM(s) Oral every 12 hours PRN  HYDROmorphone  Injectable 0.5 milliGRAM(s) IV Push every 15 minutes PRN  HYDROmorphone  Injectable 0.5 milliGRAM(s) SubCutaneous every 3 hours PRN  metoclopramide Injectable 10 milliGRAM(s) IV Push once PRN  ondansetron Injectable 4 milliGRAM(s) IV Push every 6 hours PRN  oxyCODONE    IR 5 milliGRAM(s) Oral every 4 hours PRN  oxyCODONE    IR 10 milliGRAM(s) Oral every 4 hours PRN  senna 2 Tablet(s) Oral at bedtime      Lab      Radiology    Assessment- Lumbar radiculopathy    Plan as per ortho
Ortho Post Op Check    Procedure: L3-L5 OLIF  Surgeon: Leslie    Pt comfortable without complaints, pain controlled  Denies CP, SOB, numbness/tingling   Patient with complaints of nausea/vomiting earlier in PACU which has resolved    Vital Signs Last 24 Hrs  T(C): 36.4 (09-04-19 @ 20:56), Max: 36.9 (09-04-19 @ 20:02)  T(F): 97.5 (09-04-19 @ 20:56), Max: 98.4 (09-04-19 @ 20:02)  HR: 70 (09-04-19 @ 20:56) (56 - 70)  BP: 154/62 (09-04-19 @ 20:56) (146/60 - 191/86)  BP(mean): 89 (09-04-19 @ 20:28) (89 - 136)  RR: 16 (09-04-19 @ 20:56) (10 - 20)  SpO2: 96% (09-04-19 @ 20:56) (91% - 99%)    Pt HTNive in PACU, now resolved and VSS  General: Pt Alert and oriented, NAD; Prado in place draining yellow urine  L flank: Prineo DSG C/D/I  Pulses: Feet WWP; DP pulse 2+; Cap refill < 2 sec  Sensation: SILT L2-S1 dermatomes and symmetric  Motor: Hip Flex/Knee Ext/EHL/TA/GS 5/5 and symmetric      04 Sep 2019 17:53    131    |  100    |  16     ----------------------------<  202    4.9     |  20     |  0.59     Ca    8.3        04 Sep 2019 17:53    TPro  7.0    /  Alb  4.1    /  TBili  0.9    /  DBili  x      /  AST  64     /  ALT  70     /  AlkPhos  74     04 Sep 2019 17:53
Orthopaedic Spine Resident Note    S:  No acute overnight events.  Pain well controlled.    No fevers/chills/shortness of breath/chest pain/new neurologic complaints.    Vital Signs Last 24 Hrs  T(C): 36.8 (05 Sep 2019 15:17), Max: 36.9 (04 Sep 2019 20:02)  T(F): 98.3 (05 Sep 2019 15:17), Max: 98.4 (04 Sep 2019 20:02)  HR: 81 (05 Sep 2019 15:17) (56 - 84)  BP: 150/67 (05 Sep 2019 15:17) (126/68 - 191/86)  BP(mean): 89 (04 Sep 2019 20:28) (89 - 136)  RR: 17 (05 Sep 2019 15:17) (10 - 20)  SpO2: 100% (05 Sep 2019 15:17) (91% - 100%)    VSS  General: Well-appearing adult Female lying supine; NAD; A&Ox3  Back: DSG CDI  Motor:  LLE- Hip Flex/Knee Ext/TA/EHL/GS 5/5 strength  RLE- Hip Flex/Knee Ext/TA/EHL/GS 5/5 strength  Sensory:  LLE- SILT L2-S1 dermatomes   RLE- SILT L2-S1 dermatomes  Vascular:  Feet WWP; DP 2+ bilaterally; Cap refill < 2 sec    I&O's Detail    04 Sep 2019 07:01  -  05 Sep 2019 07:00  --------------------------------------------------------  IN:    lactated ringers.: 320 mL  Total IN: 320 mL    OUT:    Estimated Blood Loss: 20 mL    Indwelling Catheter - Urethral: 885 mL  Total OUT: 905 mL    Total NET: -585 mL      05 Sep 2019 07:01  -  05 Sep 2019 17:37  --------------------------------------------------------  IN:  Total IN: 0 mL    OUT:    Indwelling Catheter - Urethral: 700 mL  Total OUT: 700 mL    Total NET: -700 mL          Labs:                        11.1   13.16 )-----------( 246      ( 05 Sep 2019 11:09 )             33.6     05 Sep 2019 11:09    129    |  96     |  17     ----------------------------<  225    4.6     |  19     |  0.73     Ca    8.6        05 Sep 2019 11:09    TPro  7.0    /  Alb  4.1    /  TBili  0.9    /  DBili  x      /  AST  64     /  ALT  70     /  AlkPhos  74     04 Sep 2019 17:53          A/P: 73y Female s/p OLIF L3-L4, plan for TLIF L4-L5 on 9/6/19    - NPO after midnight  - Stable  - Pain control as per Pain mgmt recs  - Nausea control/Bowel regiment  - Home meds  - PT  - WBAT  - DVT ppx: Vineet    Ortho Pager [phone]
Neurology Follow up note    Name  FELIPE RODGERS    HPI:  73y M with back pain and leg pain x numerous years that is worsening. Patient states pain is worse with walking. Patient has intermittent numbness/tingling. Denies any weakness. Patient does not use any ambulation device. Patient has tried oral pain meds without much relief. Patient here for staged OLIF L3-5, PSF L3-5. (04 Sep 2019 13:33)      Interval History -no new symptoms in the LE- no headaches - back pain and radicular symptoms in the LE         REVIEW OF SYSTEMS    Vital Signs Last 24 Hrs  T(C): 36.9 (06 Sep 2019 06:07), Max: 36.9 (05 Sep 2019 09:00)  T(F): 98.4 (06 Sep 2019 06:07), Max: 98.4 (05 Sep 2019 09:00)  HR: 91 (06 Sep 2019 04:55) (77 - 91)  BP: 159/83 (06 Sep 2019 06:07) (147/74 - 159/83)  BP(mean): --  RR: 16 (06 Sep 2019 06:07) (15 - 17)  SpO2: 95% (06 Sep 2019 06:07) (95% - 100%)    Physical Exam-     Mental Status- awake and alert    Cranial Nerves- full EOM    Gait and station- n/a    Motor- moves all 4 extremities    Reflexes- decreased    Sensation- no sensory level    Coordination- no tremors    Vascular - no bruits    Medications  acetaminophen   Tablet .. 650 milliGRAM(s) Oral every 6 hours  BUpivacaine liposome 1.3% Injectable (no eMAR) 20 milliLiter(s) Local Injection once  docusate sodium 100 milliGRAM(s) Oral three times a day  HYDROmorphone  Injectable 0.5 milliGRAM(s) SubCutaneous every 4 hours PRN  HYDROmorphone  Injectable 0.5 milliGRAM(s) IV Push every 15 minutes PRN  lactated ringers. 1000 milliLiter(s) IV Continuous <Continuous>  losartan 100 milliGRAM(s) Oral daily  magnesium hydroxide Suspension 30 milliLiter(s) Oral every 12 hours PRN  ondansetron Injectable 4 milliGRAM(s) IV Push once  oxyCODONE    5 mG/acetaminophen 325 mG 1 Tablet(s) Oral every 4 hours PRN  oxyCODONE    5 mG/acetaminophen 325 mG 2 Tablet(s) Oral every 4 hours PRN  senna 2 Tablet(s) Oral at bedtime      Lab      Radiology    Assessment- Lumbar radiculopathy    Plan surgery number 2 today
ORTHO NOTE    [x ] Pt seen/examined.  [ ] Pt without any complaints/in NAD.    [x ] Pt complains of: soreness at incisional site      ROS: [ ] Fever  [ ] Chills  [ ] CP [ ] SOB [ ] Dysnea  [ ] Palpitations [ ] Cough [ ] N/V/C/D [ ] Paresthia [ ] Other     [x ] ROS  otherwise negative    .    PHYSICAL EXAM:    Vital Signs Last 24 Hrs  T(C): 36.8 (05 Sep 2019 15:17), Max: 36.9 (04 Sep 2019 20:02)  T(F): 98.3 (05 Sep 2019 15:17), Max: 98.4 (04 Sep 2019 20:02)  HR: 81 (05 Sep 2019 15:17) (56 - 84)  BP: 150/67 (05 Sep 2019 15:17) (126/68 - 191/86)  BP(mean): 89 (04 Sep 2019 20:28) (89 - 136)  RR: 17 (05 Sep 2019 15:17) (10 - 20)  SpO2: 100% (05 Sep 2019 15:17) (91% - 100%)    I&O's Detail    04 Sep 2019 07:01  -  05 Sep 2019 07:00  --------------------------------------------------------  IN:    lactated ringers.: 320 mL  Total IN: 320 mL    OUT:    Estimated Blood Loss: 20 mL    Indwelling Catheter - Urethral: 885 mL  Total OUT: 905 mL    Total NET: -585 mL      05 Sep 2019 07:01  -  05 Sep 2019 17:03  --------------------------------------------------------  IN:  Total IN: 0 mL    OUT:    Indwelling Catheter - Urethral: 700 mL  Total OUT: 700 mL    Total NET: -700 mL           CAPILLARY BLOOD GLUCOSE                      Neuro: AAOX3    Lungs: nonlabored, Spo2 wnl on RA, IS demonstrated    CV:    ABD: soft, nontender  left oblique prineo incisional dressing cdi, moderate ecchymosis noted    Ext:  bilateral LE sensation: silt  bilateral LE vascular: warm, pedal pulse 2+  bilateral LE motor: 5/5 ehl/fhl/gs/ta    LABS                        11.1   13.16 )-----------( 246      ( 05 Sep 2019 11:09 )             33.6                                09-05    129<L>  |  96  |  17  ----------------------------<  225<H>  4.6   |  19<L>  |  0.73    Ca    8.6      05 Sep 2019 11:09    TPro  7.0  /  Alb  4.1  /  TBili  0.9  /  DBili  x   /  AST  64<H>  /  ALT  70<H>  /  AlkPhos  74  09-04      [ ] Other Labs  [ ] None ordered            Please check or [place] when present:  •  Heart Failure:    [ ] Acute        [ ]  Acute on Chronic        [ ] Chronic         [ ] Diastolic     [ ]  Combined    •  HOMERO:     [ ] ATN        [ ]  Renal medullary necrosis       [ ]  Renal cortical necrosis                  [ ] Other pathological Lesion:  •  CKD:  [ ] Stage I   [ ] Stage II  [ ] Stage III    [ ]Stage IV   [ ]  CKD V   [ ]  Other/Unspecified:    •  Abdominal Nutritional Status:   [ ] Malnutrition-See Nutrition note    [ ] Cachexia   [ ]  Other        [ ] Supplement ordered:            [ ] Morbid Obesity: BMI >=40         ASSESSMENT/PLAN:      STATUS POST: L3-L4 olif  pain control- po regimen and encouraged ice pack to incisional site  bowel regimen  continue velasquez catheter for comfort  plan for OR on 9/6/19 for TLIF L4-L5    CONTINUE:          [ ] PT- wbat, spinal precautions    [ ] DVT PPX- scd    [ ] Pain Mgt- po medication    [ ] Dispo plan- home
Ortho Note    Pt comfortable without complaints, pain controlled  Denies CP, SOB, N/V, numbness/tingling     Vital Signs Last 24 Hrs  T(C): 36.7 (07 Sep 2019 20:14), Max: 37.2 (07 Sep 2019 08:33)  T(F): 98 (07 Sep 2019 20:14), Max: 98.9 (07 Sep 2019 08:33)  HR: 90 (07 Sep 2019 20:14) (90 - 96)  BP: 160/70 (07 Sep 2019 20:14) (137/64 - 170/71)  BP(mean): --  RR: 17 (07 Sep 2019 20:14) (16 - 18)  SpO2: 98% (07 Sep 2019 20:14) (98% - 99%)    General: Pt Alert and oriented, NAD  Drain in place. dressing c/d/i  Pulses: 2+ DP bilaterally.   Sensation: SILT over b/l lower limbs and symmetric  Motor: 5+ quads/hams/EHL/FHL/TA/GS                        8.8    15.41 )-----------( 223      ( 07 Sep 2019 07:35 )             26.8   07 Sep 2019 07:35    132    |  100    |  15     ----------------------------<  138    4.4     |  23     |  0.58           A/P:  73yFemale POD#2 s/p PSF & TLIF L3-L5   - Stable  - Pain Control  - DVT ppx: SCDs  - PT, WBS: WBAT  - Blood pressure control  - Dispo: pending PT    Ortho Pager [phone]
Ortho Note    Pt comfortable without complaints, pain controlled  Denies CP, SOB, N/V, numbness/tingling     Vital Signs Last 24 Hrs  T(C): 36.7 (09-07-19 @ 05:27), Max: 36.7 (09-07-19 @ 05:27)  T(F): 98 (09-07-19 @ 05:27), Max: 98 (09-07-19 @ 05:27)  HR: 85 (09-07-19 @ 05:27) (85 - 85)  BP: 149/72 (09-07-19 @ 05:27) (149/72 - 149/72)  BP(mean): --  RR: 17 (09-07-19 @ 05:27) (17 - 17)  SpO2: 99% (09-07-19 @ 05:27) (99% - 99%)      General: Pt Alert and oriented, NAD  Drain in place. dressing c/d/i  Pulses: 2+ DP bilaterally.   Sensation: SILT over b/l lower limbs and symmetric  Motor: 5+ quads/hams/EHL/FHL/TA/GS                          10.0   17.46 )-----------( 252      ( 06 Sep 2019 17:02 )             29.0   06 Sep 2019 17:02    130    |  99     |  13     ----------------------------<  255    4.6     |  19     |  0.57           A/P:  73yFemale POD#1 s/p PSF & TLIF L3-L5   - Stable  - Pain Control  - DVT ppx: SCDs  - PT, WBS: WBAT  - Dispo: pending PT    Ortho Pager [phone]
Ortho Note    Pt comfortable without complaints, pain controlled. Sodium 130  Denies CP, SOB, N/V, numbness/tingling     Vital Signs Last 24 Hrs  T(C): 36.9 (09 Sep 2019 09:26), Max: 37.3 (08 Sep 2019 20:18)  T(F): 98.4 (09 Sep 2019 09:26), Max: 99.2 (08 Sep 2019 20:18)  HR: 84 (09 Sep 2019 09:26) (84 - 92)  BP: 144/50 (09 Sep 2019 09:26) (137/54 - 173/68)  BP(mean): --  RR: 16 (09 Sep 2019 09:26) (15 - 18)  SpO2: 100% (09 Sep 2019 09:26) (96% - 100%)    General: Pt Alert and oriented, NAD  Drain in place. dressing c/d/i  Pulses: 2+ DP bilaterally.   Sensation: SILT over b/l lower limbs and symmetric  Motor: 5+ quads/hams/EHL/FHL/TA/GS                                               7.9    11.19 )-----------( 255      ( 09 Sep 2019 08:05 )             23.9   09-09    130<L>  |  97  |  10  ----------------------------<  164<H>  3.8   |  24  |  0.59    Ca    8.7      09 Sep 2019 08:05        A/P:  73yFemale POD#3s/p PSF & TLIF L3-L5   - Stable  - Pain Control  - DVT ppx: SCDs  - PT, WBS: WBAT  - Blood pressure control  - Drains out  - Dispo: home today    Ortho Pager [phone]
Ortho Post Op Check    Procedure:  TLIF L3-L5  Surgeon:  Dr. Nelson     Pt comfortable without complaints, pain controlled.  Denies CP, SOB, N/V, numbness/tingling down le b/l    Vital Signs Last 24 Hrs  T(C): 36.1 (09-06-19 @ 14:31), Max: 36.1 (09-06-19 @ 14:31)  T(F): 97 (09-06-19 @ 14:31), Max: 97 (09-06-19 @ 14:31)  HR: 102 (09-06-19 @ 15:51) (88 - 102)  BP: 177/73 (09-06-19 @ 15:51) (126/53 - 177/73)  BP(mean): 114 (09-06-19 @ 15:51) (74 - 114)  RR: 23 (09-06-19 @ 15:51) (12 - 23)  SpO2: 99% (09-06-19 @ 15:51) (97% - 99%)      General: Pt Alert and oriented, NAD  DSG gauze tegaderm C/D/I HV x 1  Pulses: DPs 2+ b/l, brisk cap refill b/l  Sensation:  SILT throughout le b/l  Motor: EHL/FHL/TA/GS 5/5 b/l                          11.1   13.16 )-----------( 246      ( 05 Sep 2019 11:09 )             33.6   06 Sep 2019 07:12    130    |  98     |  13     ----------------------------<  161    5.0     |  19     |  0.55       TPro  7.0    /  Alb  4.1    /  TBili  0.9    /  DBili  x      /  AST  64     /  ALT  70     /  AlkPhos  74     04 Sep 2019 17:53      Post-op X-Ray: f/u    A/P: 73yFemale POD#0 s/p PSF & TLIF L3-L5   - Stable  - Pain Control as needed  - DVT ppx:  scds  - Post op abx: Ancef  - PT, WBS:   WBAT  - Hydralazine 10mg given by anesthesia for SBP>180  - Monitor drain output  - Trend labs  - Dispo pending PT eval    Ortho Pager [phone]
Orthopaedic Spine Resident Note    S:  No acute overnight events.  Pain well controlled. For OR todat  No fevers/chills/shortness of breath/chest pain/new neurologic complaints.    Vital Signs Last 24 Hrs  T(C): 36.9 (06 Sep 2019 06:07), Max: 36.9 (05 Sep 2019 09:00)  T(F): 98.4 (06 Sep 2019 06:07), Max: 98.4 (05 Sep 2019 09:00)  HR: 91 (06 Sep 2019 04:55) (77 - 91)  BP: 159/83 (06 Sep 2019 06:07) (147/74 - 159/83)  BP(mean): --  RR: 16 (06 Sep 2019 06:07) (15 - 17)  SpO2: 95% (06 Sep 2019 06:07) (95% - 100%)    VSS  General: Well-appearing adult Female lying supine; NAD; A&Ox3  Back: DSG CDI  Motor:  LLE- Hip Flex/Knee Ext/TA/EHL/GS 5/5 strength  RLE- Hip Flex/Knee Ext/TA/EHL/GS 5/5 strength  Sensory:  LLE- SILT L2-S1 dermatomes   RLE- SILT L2-S1 dermatomes  Vascular:  Feet WWP; DP 2+ bilaterally; Cap refill < 2 sec      I&O's Summary    04 Sep 2019 07:01  -  05 Sep 2019 07:00  --------------------------------------------------------  IN: 320 mL / OUT: 905 mL / NET: -585 mL    05 Sep 2019 07:01  -  06 Sep 2019 06:29  --------------------------------------------------------  IN: 700 mL / OUT: 1575 mL / NET: -875 mL                                11.1   13.16 )-----------( 246      ( 05 Sep 2019 11:09 )             33.6     Labs:                        A/P: 73y Female s/p OLIF L3-L4, plan for TLIF L4-L5 on 9/6/19    - NPO  - Stable  - Pain control as per Pain mgmt recs  - Nausea control/Bowel regiment  - Home meds  - PT  - WBAT  - DVT ppx: SCDs  - OR today    Ortho Pager [phone]

## 2019-09-24 DIAGNOSIS — E87.1 HYPO-OSMOLALITY AND HYPONATREMIA: ICD-10-CM

## 2019-09-24 DIAGNOSIS — I10 ESSENTIAL (PRIMARY) HYPERTENSION: ICD-10-CM

## 2019-09-24 DIAGNOSIS — M51.16 INTERVERTEBRAL DISC DISORDERS WITH RADICULOPATHY, LUMBAR REGION: ICD-10-CM

## 2019-09-24 DIAGNOSIS — M48.062 SPINAL STENOSIS, LUMBAR REGION WITH NEUROGENIC CLAUDICATION: ICD-10-CM

## 2019-09-24 DIAGNOSIS — Q76.49 OTHER CONGENITAL MALFORMATIONS OF SPINE, NOT ASSOCIATED WITH SCOLIOSIS: ICD-10-CM

## 2019-09-24 DIAGNOSIS — M46.96 UNSPECIFIED INFLAMMATORY SPONDYLOPATHY, LUMBAR REGION: ICD-10-CM

## 2019-10-13 PROCEDURE — 86850 RBC ANTIBODY SCREEN: CPT

## 2019-10-13 PROCEDURE — 86900 BLOOD TYPING SEROLOGIC ABO: CPT

## 2019-10-13 PROCEDURE — 95940 IONM IN OPERATNG ROOM 15 MIN: CPT

## 2019-10-13 PROCEDURE — 85018 HEMOGLOBIN: CPT

## 2019-10-13 PROCEDURE — C1713: CPT

## 2019-10-13 PROCEDURE — 86901 BLOOD TYPING SEROLOGIC RH(D): CPT

## 2019-10-13 PROCEDURE — 82330 ASSAY OF CALCIUM: CPT

## 2019-10-13 PROCEDURE — 81001 URINALYSIS AUTO W/SCOPE: CPT

## 2019-10-13 PROCEDURE — 97116 GAIT TRAINING THERAPY: CPT

## 2019-10-13 PROCEDURE — 86803 HEPATITIS C AB TEST: CPT

## 2019-10-13 PROCEDURE — C1889: CPT

## 2019-10-13 PROCEDURE — 84132 ASSAY OF SERUM POTASSIUM: CPT

## 2019-10-13 PROCEDURE — 82962 GLUCOSE BLOOD TEST: CPT

## 2019-10-13 PROCEDURE — 87086 URINE CULTURE/COLONY COUNT: CPT

## 2019-10-13 PROCEDURE — 85025 COMPLETE CBC W/AUTO DIFF WBC: CPT

## 2019-10-13 PROCEDURE — 84295 ASSAY OF SERUM SODIUM: CPT

## 2019-10-13 PROCEDURE — 85027 COMPLETE CBC AUTOMATED: CPT

## 2019-10-13 PROCEDURE — 97161 PT EVAL LOW COMPLEX 20 MIN: CPT

## 2019-10-13 PROCEDURE — 36415 COLL VENOUS BLD VENIPUNCTURE: CPT

## 2019-10-13 PROCEDURE — 80048 BASIC METABOLIC PNL TOTAL CA: CPT

## 2019-10-13 PROCEDURE — 97164 PT RE-EVAL EST PLAN CARE: CPT

## 2019-10-13 PROCEDURE — 76000 FLUOROSCOPY <1 HR PHYS/QHP: CPT

## 2019-10-13 PROCEDURE — 80053 COMPREHEN METABOLIC PANEL: CPT

## 2023-11-04 NOTE — PRE-OP CHECKLIST - BOWEL PREP
Received patient AXOX1, remains calm.. 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 Continue with external urine cathter, voiding yellow urine. Continue on iv fluids LR at 100 ml/hr., iv abg Mag level is 1.5 given po and mag 2 gm rider. K+ 3.3 given Kcl 40 yael pox 1. Call light is in place, bed alarm is on. Bed is in its  Lowest  Position. Continue to monitor   n/a